# Patient Record
Sex: FEMALE | Race: ASIAN | NOT HISPANIC OR LATINO | Employment: UNEMPLOYED | URBAN - METROPOLITAN AREA
[De-identification: names, ages, dates, MRNs, and addresses within clinical notes are randomized per-mention and may not be internally consistent; named-entity substitution may affect disease eponyms.]

---
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
## 2022-11-05 ENCOUNTER — NURSE TRIAGE (OUTPATIENT)
Dept: NURSING | Facility: CLINIC | Age: 22
End: 2022-11-05

## 2022-11-05 ENCOUNTER — HOSPITAL ENCOUNTER (EMERGENCY)
Facility: CLINIC | Age: 22
Discharge: HOME | End: 2022-11-05
Attending: EMERGENCY MEDICINE | Admitting: EMERGENCY MEDICINE
Payer: COMMERCIAL

## 2022-11-05 VITALS
HEART RATE: 75 BPM | TEMPERATURE: 98.1 F | DIASTOLIC BLOOD PRESSURE: 77 MMHG | SYSTOLIC BLOOD PRESSURE: 114 MMHG | RESPIRATION RATE: 18 BRPM | OXYGEN SATURATION: 98 % | WEIGHT: 120 LBS

## 2022-11-05 DIAGNOSIS — Z77.29 CARBON MONOXIDE EXPOSURE: ICD-10-CM

## 2022-11-05 LAB
ANION GAP SERPL CALCULATED.3IONS-SCNC: 14 MMOL/L (ref 7–15)
BASOPHILS # BLD AUTO: 0 10E3/UL (ref 0–0.2)
BASOPHILS NFR BLD AUTO: 0 %
BUN SERPL-MCNC: 14.2 MG/DL (ref 6–20)
CALCIUM SERPL-MCNC: 9.6 MG/DL (ref 8.6–10)
CHLORIDE SERPL-SCNC: 103 MMOL/L (ref 98–107)
COHGB MFR BLD: 2.2 % (ref 0–2)
CREAT SERPL-MCNC: 0.74 MG/DL (ref 0.51–0.95)
DEPRECATED HCO3 PLAS-SCNC: 22 MMOL/L (ref 22–29)
EOSINOPHIL # BLD AUTO: 0 10E3/UL (ref 0–0.7)
EOSINOPHIL NFR BLD AUTO: 0 %
ERYTHROCYTE [DISTWIDTH] IN BLOOD BY AUTOMATED COUNT: 22.9 % (ref 10–15)
GFR SERPL CREATININE-BSD FRML MDRD: >90 ML/MIN/1.73M2
GLUCOSE SERPL-MCNC: 115 MG/DL (ref 70–99)
HCT VFR BLD AUTO: 39.8 % (ref 35–47)
HGB BLD-MCNC: 12.6 G/DL (ref 11.7–15.7)
HOLD SPECIMEN: NORMAL
HOLD SPECIMEN: NORMAL
IMM GRANULOCYTES # BLD: 0 10E3/UL
IMM GRANULOCYTES NFR BLD: 0 %
LYMPHOCYTES # BLD AUTO: 1.9 10E3/UL (ref 0.8–5.3)
LYMPHOCYTES NFR BLD AUTO: 27 %
MCH RBC QN AUTO: 25.5 PG (ref 26.5–33)
MCHC RBC AUTO-ENTMCNC: 31.7 G/DL (ref 31.5–36.5)
MCV RBC AUTO: 81 FL (ref 78–100)
MONOCYTES # BLD AUTO: 0.3 10E3/UL (ref 0–1.3)
MONOCYTES NFR BLD AUTO: 4 %
NEUTROPHILS # BLD AUTO: 4.7 10E3/UL (ref 1.6–8.3)
NEUTROPHILS NFR BLD AUTO: 69 %
NRBC # BLD AUTO: 0 10E3/UL
NRBC BLD AUTO-RTO: 0 /100
PLATELET # BLD AUTO: 225 10E3/UL (ref 150–450)
POTASSIUM SERPL-SCNC: 4.1 MMOL/L (ref 3.4–5.3)
RBC # BLD AUTO: 4.94 10E6/UL (ref 3.8–5.2)
SODIUM SERPL-SCNC: 139 MMOL/L (ref 136–145)
WBC # BLD AUTO: 6.9 10E3/UL (ref 4–11)

## 2022-11-05 PROCEDURE — 85014 HEMATOCRIT: CPT | Performed by: EMERGENCY MEDICINE

## 2022-11-05 PROCEDURE — 36415 COLL VENOUS BLD VENIPUNCTURE: CPT | Performed by: EMERGENCY MEDICINE

## 2022-11-05 PROCEDURE — 99283 EMERGENCY DEPT VISIT LOW MDM: CPT

## 2022-11-05 PROCEDURE — 82375 ASSAY CARBOXYHB QUANT: CPT | Performed by: EMERGENCY MEDICINE

## 2022-11-05 PROCEDURE — 80048 BASIC METABOLIC PNL TOTAL CA: CPT | Performed by: EMERGENCY MEDICINE

## 2022-11-05 NOTE — ED TRIAGE NOTES
Fell asleep in her studio apartment last night and woke up to a natural gas smell. Found that her burner was left partially on. Has a headache and nausea. Concerned about CO poisoning.      Triage Assessment     Row Name 11/05/22 6460       Triage Assessment (Adult)    Airway WDL WDL       Respiratory WDL    Respiratory WDL WDL       Skin Circulation/Temperature WDL    Skin Circulation/Temperature WDL WDL       Cardiac WDL    Cardiac WDL WDL       Peripheral/Neurovascular WDL    Peripheral Neurovascular WDL WDL       Cognitive/Neuro/Behavioral WDL    Cognitive/Neuro/Behavioral WDL WDL

## 2022-11-05 NOTE — TELEPHONE ENCOUNTER
Mom calling with patient present answering questions. Reports the patient cooked something yesterday, left the stove on overnight and inhaled carbon monoxide overnight and this morning. Patient sleeps [facility] apartment 6 feet from the stove. Reporting headache, nausea, dizziness and feeling fuzzy. Denies breathing stopped, confusion and difficulty breathing. Advised per protocol to be seen in the ER with mom declining this. Reports wait times are over 2 hours. Will bring patient to urgent care now.     Aydee Saravia RN 11/05/22 2:40 PM   [facility] Triage Nurse Advisor        Reason for Disposition    [1] KNOWN CO EXPOSURE (e.g., other victims with CO poisoning) within past 24 hours AND [2] CO poisoning symptoms present now    Additional Information    Negative: [1] Breathing stopped AND [2] hasn't returned    Negative: Bluish (or gray) lips or face now    Negative: Difficult to awaken or acting confused (e.g., disoriented, slurred speech)    Negative: Difficulty breathing    Negative: Chest pain or heaviness (present now)    Negative: Seizure    Negative: Patient attempted suicide    Negative: Sounds like a life-threatening emergency to the triager    Protocols used: CARBON MONOXIDE EXPOSURE-A-

## 2022-11-05 NOTE — TELEPHONE ENCOUNTER
Patient calling with her mother. Fanny had left a gas burner on low in her apartment all night and did not realize until the next morning. States that she is feeling dizzy and nauseated. Her mother states that they called the ED and there is a 4 hour wait. They are wondering if the other ED's have less wait times. During the call the patient mother said her sister, who is a physician, was calling and ended the call. No triage completed.     ARIANNE SAMPSON RN

## 2022-11-05 NOTE — ED PROVIDER NOTES
History     Chief Complaint:    Toxic Inhalation      HPI   Fanny Russell is a 22 year old female for evaluation of possible carbon monoxide exposure.  Patient states that she woke up in her studio apartment this morning, smelled natural gas smell, and realized that the gas burner was partially on.  At that time, she had no symptoms and open the windows to her apartment.  She subsequently closed in 1 to began to rain.  Later in the day, she smelled, and noticed headache and nausea without vomiting.  She called for a maintenance crew to go to the site to check the gas burner as well as to check for carbon oxide level.  Currently, she endorsed some mild to moderate headache nausea, but also endorses that her anxiety may be contributing to this.  She denies chance of pregnancy, syncope, neurologic deficits, or any other concerns.    Review of Systems  Neuro: + as per above  GI: + as per above  All other systems reviewed and negative      Allergies:Sulfamethoxazole-Trimethoprim      Medications:    ALBUTEROL IN  BACTRIM 200-40 MG/5ML OR SUSP  FLOVENT IN        Past Medical History:    Anxiety       Past Surgical History:    None    Family History:    None    Social History:  Presents with mom    Physical Exam     Patient Vitals for the past 24 hrs:   BP Temp Temp src Pulse Resp SpO2 Weight   11/05/22 1558 114/77 98.1  F (36.7  C) Temporal 75 18 98 % 54.4 kg (120 lb)       Physical Exam  Constitutional: Alert, attentive  HENT:    Nose: Nose normal.   Eyes: EOM are normal.   CV: regular rate and rhythm; no murmurs, rubs or gallups  Chest: Effort normal and breath sounds normal.   GI:  There is no tenderness. No distension. Normal bowel sounds  MSK: Normal range of motion.   Neurological: Alert, attentive   Normal gait  Skin: Skin is warm and dry.      Emergency Department Course     Laboratory:  Labs Ordered and Resulted from Time of ED Arrival to Time of ED Departure   CARBON MONOXIDE - Abnormal       Result Value     Carbon Monoxide 2.2 (*)      Results for orders placed or performed during the hospital encounter of 11/05/22 (from the past 24 hour(s))   CBC + differential    Narrative    The following orders were created for panel order CBC + differential.  Procedure                               Abnormality         Status                     ---------                               -----------         ------                     CBC with platelets and d...[[id number]]  Abnormal            Final result                 Please view results for these tests on the individual orders.   Basic metabolic panel (BMP)   Result Value Ref Range    Sodium 139 136 - 145 mmol/L    Potassium 4.1 3.4 - 5.3 mmol/L    Chloride 103 98 - 107 mmol/L    Carbon Dioxide (CO2) 22 22 - 29 mmol/L    Anion Gap 14 7 - 15 mmol/L    Urea Nitrogen 14.2 6.0 - 20.0 mg/dL    Creatinine 0.74 0.51 - 0.95 mg/dL    Calcium 9.6 8.6 - 10.0 mg/dL    Glucose 115 (H) 70 - 99 mg/dL    GFR Estimate >90 >60 mL/min/1.73m2   Carbon monoxide   Result Value Ref Range    Carbon Monoxide 2.2 (H) 0.0 - 2.0 %   [place] Draw    Narrative    The following orders were created for panel order [place] Draw.  Procedure                               Abnormality         Status                     ---------                               -----------         ------                     Extra Blue Top Tube[[id number]]                              Final result               Extra Red Top Tube[[id number]]                               Final result                 Please view results for these tests on the individual orders.   CBC with platelets and differential   Result Value Ref Range    WBC Count 6.9 4.0 - 11.0 10e3/uL    RBC Count 4.94 3.80 - 5.20 10e6/uL    Hemoglobin 12.6 11.7 - 15.7 g/dL    Hematocrit 39.8 35.0 - 47.0 %    MCV 81 78 - 100 fL    MCH 25.5 (L) 26.5 - 33.0 pg    MCHC 31.7 31.5 - 36.5 g/dL    RDW 22.9 (H) 10.0 - 15.0 %    Platelet Count 225 150 - 450 10e3/uL    % Neutrophils 69 %    %  Lymphocytes 27 %    % Monocytes 4 %    % Eosinophils 0 %    % Basophils 0 %    % Immature Granulocytes 0 %    NRBCs per 100 WBC 0 <1 /100    Absolute Neutrophils 4.7 1.6 - 8.3 10e3/uL    Absolute Lymphocytes 1.9 0.8 - 5.3 10e3/uL    Absolute Monocytes 0.3 0.0 - 1.3 10e3/uL    Absolute Eosinophils 0.0 0.0 - 0.7 10e3/uL    Absolute Basophils 0.0 0.0 - 0.2 10e3/uL    Absolute Immature Granulocytes 0.0 <=0.4 10e3/uL    Absolute NRBCs 0.0 10e3/uL   Extra Blue Top Tube   Result Value Ref Range    Hold Specimen JIC    Extra Red Top Tube   Result Value Ref Range    Hold Specimen JIC          Procedures:      Emergency Department Course:             Reviewed:    I reviewed nursing notes, vitals and past medical history    Assessments:   I obtained history and examined the patient as noted above.    I rechecked the patient and explained findings.     Disposition:  The patient was discharged to home.     Impression & Plan      Medical Decision Making:  This is a 22-year-old female who presents for evaluation of possible CO exposure.  Symptoms are consistent with mild exposure and history is suggestive of the same.  Testing has not been performed at the apartment at the time of evaluation.  CO level here is 2.2, which is slightly above lab reference range but could be normal.  There is no indication for intervention at this time, whether oxygen therapy or HBO.  Discussed the natural history of CO exposure.  Encouraged testing at the site before reentry.  Emphasized that this likely represents mild exposure and that symptoms should improve.  Plan primary care follow-up as needed and return precautions for any concerns.      Diagnosis:    ICD-10-CM    1. Carbon monoxide exposure  Z77.29              Mendez Astorga MD  11/05/22 1954

## 2024-03-13 ENCOUNTER — LAB REQUISITION (OUTPATIENT)
Dept: LAB | Facility: CLINIC | Age: 24
End: 2024-03-13

## 2024-03-13 DIAGNOSIS — Z12.4 ENCOUNTER FOR SCREENING FOR MALIGNANT NEOPLASM OF CERVIX: ICD-10-CM

## 2024-03-13 DIAGNOSIS — Z11.3 ENCOUNTER FOR SCREENING FOR INFECTIONS WITH A PREDOMINANTLY SEXUAL MODE OF TRANSMISSION: ICD-10-CM

## 2024-03-13 PROCEDURE — G0145 SCR C/V CYTO,THINLAYER,RESCR: HCPCS | Performed by: OBSTETRICS & GYNECOLOGY

## 2024-03-13 PROCEDURE — 87491 CHLMYD TRACH DNA AMP PROBE: CPT | Performed by: OBSTETRICS & GYNECOLOGY

## 2024-03-15 LAB
C TRACH DNA SPEC QL PROBE+SIG AMP: NEGATIVE
N GONORRHOEA DNA SPEC QL NAA+PROBE: NEGATIVE

## 2024-03-18 LAB
BKR LAB AP GYN ADEQUACY: NORMAL
BKR LAB AP GYN INTERPRETATION: NORMAL
BKR LAB AP HPV REFLEX: NORMAL
BKR LAB AP LMP: NORMAL
BKR LAB AP PREVIOUS ABNL DX: NORMAL
BKR LAB AP PREVIOUS ABNORMAL: NORMAL
PATH REPORT.COMMENTS IMP SPEC: NORMAL
PATH REPORT.COMMENTS IMP SPEC: NORMAL
PATH REPORT.RELEVANT HX SPEC: NORMAL

## 2025-07-25 ENCOUNTER — LAB REQUISITION (OUTPATIENT)
Dept: LAB | Facility: CLINIC | Age: 25
End: 2025-07-25

## 2025-07-25 DIAGNOSIS — Z11.3 ENCOUNTER FOR SCREENING FOR INFECTIONS WITH A PREDOMINANTLY SEXUAL MODE OF TRANSMISSION: ICD-10-CM

## 2025-07-25 PROCEDURE — 87491 CHLMYD TRACH DNA AMP PROBE: CPT | Performed by: NURSE PRACTITIONER

## 2025-07-25 PROCEDURE — 81515 NFCT DS BV&VAGINITIS DNA ALG: CPT | Performed by: NURSE PRACTITIONER

## 2025-07-26 LAB
BACTERIAL VAGINOSIS VAG-IMP: NEGATIVE
C TRACH DNA SPEC QL PROBE+SIG AMP: NEGATIVE
CANDIDA DNA VAG QL NAA+PROBE: NOT DETECTED
CANDIDA GLABRATA / CANDIDA KRUSEI DNA: NOT DETECTED
N GONORRHOEA DNA SPEC QL NAA+PROBE: NEGATIVE
SPECIMEN TYPE: NORMAL
T VAGINALIS DNA VAG QL NAA+PROBE: NOT DETECTED